# Patient Record
Sex: FEMALE | URBAN - METROPOLITAN AREA
[De-identification: names, ages, dates, MRNs, and addresses within clinical notes are randomized per-mention and may not be internally consistent; named-entity substitution may affect disease eponyms.]

---

## 2019-03-07 ENCOUNTER — OFFICE VISIT (OUTPATIENT)
Dept: PLASTIC SURGERY | Facility: CLINIC | Age: 69
End: 2019-03-07

## 2019-03-07 DIAGNOSIS — Z41.1 ENCOUNTER FOR COSMETIC PROCEDURE: Primary | ICD-10-CM

## 2019-03-07 NOTE — LETTER
Date:May 3, 2019      Patient was self referred, no letter generated. Do not send.        [facility] Health Information

## 2019-03-07 NOTE — LETTER
2019  Re: Carol Hidalgo  1950    Dear Dr. Garcia,    Thank you so much for referring Carol Hidalgo to the [facility]. I had the pleasure of visiting with Carol today.     Attached you will find a copy of my note. Please feel free to reach out to me with any questions, [phone].     This office note has been dictated.     Service Date: 2019      Ms. Hidalgo is back today and has again some dimpling from the mentalis on the left.  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  I do not really see any asymmetry there, and I cannot appreciate how at this long after, there would still be a Botox effect in that location.  Given the issues, she wants to be very cautious about having more Botox.  I am appreciative of that.  She would like to defer today.  I encouraged her in that regard.  She wanted to know what other options are available.  I told her there really is not any surgery that is helpful.  She can try a different neuropraxic agent.  Fillers can be used, but they only improve the static contour and the dynamic contour change, is, in fact, the biggest issue.      ASSESSMENT:  Mentalis hypertonicity.      RECOMMENDATION:  Follow up as needed.         SUNDAY HARRIS MD             D: 2019   T: 2019   MT: al      Name:     CAROL HIDALGO   MRN:      [id number]        Account:      [id number]   :      1950           Service Date: 2019      Document: [id number]         Your trust in our practice and care is much appreciated.    Sincerely,  SUNDAY HARRIS MD

## 2019-03-08 NOTE — PROGRESS NOTES
Service Date: 2019      Ms. Hidalgo is back today and has again some dimpling from the mentalis on the left.  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  I do not really see any asymmetry there, and I cannot appreciate how at this long after, there would still be a Botox effect in that location.  Given the issues, she wants to be very cautious about having more Botox.  I am appreciative of that.  She would like to defer today.  I encouraged her in that regard.  She wanted to know what other options are available.  I told her there really is not any surgery that is helpful.  She can try a different neuropraxic agent.  Fillers can be used, but they only improve the static contour and the dynamic contour change, is, in fact, the biggest issue.      ASSESSMENT:  Mentalis hypertonicity.      RECOMMENDATION:  Follow up as needed.         SUNDAY HARRIS MD             D: 2019   T: 2019   MT: al      Name:     MARCIA HIDALGO   MRN:      [id number]        Account:      [id number]   :      1950           Service Date: 2019      Document: [id number]